# Patient Record
Sex: FEMALE | Race: WHITE | NOT HISPANIC OR LATINO | URBAN - METROPOLITAN AREA
[De-identification: names, ages, dates, MRNs, and addresses within clinical notes are randomized per-mention and may not be internally consistent; named-entity substitution may affect disease eponyms.]

---

## 2019-12-28 ENCOUNTER — EMERGENCY (EMERGENCY)
Facility: HOSPITAL | Age: 39
LOS: 1 days | End: 2019-12-28
Attending: EMERGENCY MEDICINE
Payer: MEDICAID

## 2019-12-28 VITALS
DIASTOLIC BLOOD PRESSURE: 74 MMHG | WEIGHT: 179.9 LBS | RESPIRATION RATE: 16 BRPM | SYSTOLIC BLOOD PRESSURE: 124 MMHG | HEIGHT: 65 IN | OXYGEN SATURATION: 96 % | TEMPERATURE: 98 F | HEART RATE: 100 BPM

## 2019-12-28 PROCEDURE — 99283 EMERGENCY DEPT VISIT LOW MDM: CPT | Mod: 25

## 2019-12-28 PROCEDURE — 12011 RPR F/E/E/N/L/M 2.5 CM/<: CPT
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
## 2019-12-28 NOTE — ED ADULT TRIAGE NOTE - STATUS:
Applied I will SWITCH the dose or number of times a day I take the medications listed below when I get home from the hospital:  None
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
## 2019-12-31 NOTE — ED PROVIDER NOTE - PATIENT PORTAL LINK FT
You can access the FollowMyHealth Patient Portal offered by [facility] by registering at the following website: [email or web address]. By joining [employer]’s FollowMyHealth portal, you will also be able to view your health information using other applications (apps) compatible with our system.

## 2019-12-31 NOTE — ED PROVIDER NOTE - PHYSICAL EXAMINATION
A&Ox3,follows command, responds appropriately  CN: II-XII : Conjugate gaze, PERRLA, Visual field full to confrontation, EOMI with out nystagmus, pursuit smooth without saccade.  Facial: Sensation and muscle activation intact bilateral. Hearing intact bilateral  Palate: Elevate symmetrically . Shoulder shrug and neck turn full strength. Tongue midline  Motor: UE and LE strength 5/5  Sensory : pin prick and temp intact and equal bilaterally. Vibration and  proprioception intact bilaterally   Reflex : Biceps    brachioradialis        triceps patella  achilles  L              2+                2+                   2+       2+       2+  R              2+                2+                  2 +       2+       2+  Cerebellar: Rapid alternating movement and regular rhythm without bradykinesia                      Finger to nose and heel-to-shin intact bilaterally without dysmetria or overshoot  Gait narrow base. No shuffling. Full hip flexion and knee flexion. Negative Romberg  No involuntary movement . No pronotor drift. No clonus.

## 2019-12-31 NOTE — ED PROVIDER NOTE - NONTENDER LOCATION
left lower quadrant/periumbilical/left upper quadrant/umbilical/right upper quadrant/right lower quadrant
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
## 2020-04-20 ENCOUNTER — EMERGENCY (EMERGENCY)
Facility: HOSPITAL | Age: 40
LOS: 1 days | Discharge: HOME | End: 2020-04-20
Attending: EMERGENCY MEDICINE
Payer: MEDICAID

## 2020-04-20 VITALS
HEART RATE: 86 BPM | OXYGEN SATURATION: 97 % | SYSTOLIC BLOOD PRESSURE: 146 MMHG | TEMPERATURE: 98 F | RESPIRATION RATE: 18 BRPM | DIASTOLIC BLOOD PRESSURE: 84 MMHG

## 2020-04-20 PROBLEM — Z78.9 OTHER SPECIFIED HEALTH STATUS: Chronic | Status: ACTIVE | Noted: 2019-12-28

## 2020-04-20 LAB
ALBUMIN SERPL ELPH-MCNC: 4.4 G/DL — SIGNIFICANT CHANGE UP (ref 3.3–5.2)
ALP SERPL-CCNC: 63 U/L — SIGNIFICANT CHANGE UP (ref 40–120)
ALT FLD-CCNC: 7 U/L — SIGNIFICANT CHANGE UP
ANION GAP SERPL CALC-SCNC: 15 MMOL/L — SIGNIFICANT CHANGE UP (ref 5–17)
APTT BLD: 27.6 SEC — SIGNIFICANT CHANGE UP (ref 27.5–36.3)
AST SERPL-CCNC: 12 U/L — SIGNIFICANT CHANGE UP
BASOPHILS # BLD AUTO: 0.18 K/UL — SIGNIFICANT CHANGE UP (ref 0–0.2)
BASOPHILS NFR BLD AUTO: 0.9 % — SIGNIFICANT CHANGE UP (ref 0–2)
BILIRUB SERPL-MCNC: 0.5 MG/DL — SIGNIFICANT CHANGE UP (ref 0.4–2)
BLD GP AB SCN SERPL QL: SIGNIFICANT CHANGE UP
BUN SERPL-MCNC: 9 MG/DL — SIGNIFICANT CHANGE UP (ref 8–20)
CALCIUM SERPL-MCNC: 9.6 MG/DL — SIGNIFICANT CHANGE UP (ref 8.6–10.2)
CHLORIDE SERPL-SCNC: 101 MMOL/L — SIGNIFICANT CHANGE UP (ref 98–107)
CO2 SERPL-SCNC: 24 MMOL/L — SIGNIFICANT CHANGE UP (ref 22–29)
CREAT SERPL-MCNC: 0.84 MG/DL — SIGNIFICANT CHANGE UP (ref 0.5–1.3)
EOSINOPHIL # BLD AUTO: 0 K/UL — SIGNIFICANT CHANGE UP (ref 0–0.5)
EOSINOPHIL NFR BLD AUTO: 0 % — SIGNIFICANT CHANGE UP (ref 0–6)
ETHANOL SERPL-MCNC: <10 MG/DL — SIGNIFICANT CHANGE UP
GLUCOSE SERPL-MCNC: 91 MG/DL — SIGNIFICANT CHANGE UP (ref 70–99)
HCT VFR BLD CALC: 43.7 % — SIGNIFICANT CHANGE UP (ref 34.5–45)
HGB BLD-MCNC: 14.5 G/DL — SIGNIFICANT CHANGE UP (ref 11.5–15.5)
INR BLD: 0.99 RATIO — SIGNIFICANT CHANGE UP (ref 0.88–1.16)
LIDOCAIN IGE QN: 26 U/L — SIGNIFICANT CHANGE UP (ref 22–51)
LYMPHOCYTES # BLD AUTO: 10.5 % — LOW (ref 13–44)
LYMPHOCYTES # BLD AUTO: 2.09 K/UL — SIGNIFICANT CHANGE UP (ref 1–3.3)
MANUAL SMEAR VERIFICATION: SIGNIFICANT CHANGE UP
MCHC RBC-ENTMCNC: 31.5 PG — SIGNIFICANT CHANGE UP (ref 27–34)
MCHC RBC-ENTMCNC: 33.2 GM/DL — SIGNIFICANT CHANGE UP (ref 32–36)
MCV RBC AUTO: 95 FL — SIGNIFICANT CHANGE UP (ref 80–100)
MONOCYTES # BLD AUTO: 0.87 K/UL — SIGNIFICANT CHANGE UP (ref 0–0.9)
MONOCYTES NFR BLD AUTO: 4.4 % — SIGNIFICANT CHANGE UP (ref 2–14)
NEUTROPHILS # BLD AUTO: 15.69 K/UL — HIGH (ref 1.8–7.4)
NEUTROPHILS NFR BLD AUTO: 78.9 % — HIGH (ref 43–77)
PLAT MORPH BLD: NORMAL — SIGNIFICANT CHANGE UP
PLATELET # BLD AUTO: 388 K/UL — SIGNIFICANT CHANGE UP (ref 150–400)
POTASSIUM SERPL-MCNC: 4.1 MMOL/L — SIGNIFICANT CHANGE UP (ref 3.5–5.3)
POTASSIUM SERPL-SCNC: 4.1 MMOL/L — SIGNIFICANT CHANGE UP (ref 3.5–5.3)
PROT SERPL-MCNC: 7 G/DL — SIGNIFICANT CHANGE UP (ref 6.6–8.7)
PROTHROM AB SERPL-ACNC: 11.2 SEC — SIGNIFICANT CHANGE UP (ref 10–12.9)
RBC # BLD: 4.6 M/UL — SIGNIFICANT CHANGE UP (ref 3.8–5.2)
RBC # FLD: 12.1 % — SIGNIFICANT CHANGE UP (ref 10.3–14.5)
RBC BLD AUTO: NORMAL — SIGNIFICANT CHANGE UP
SODIUM SERPL-SCNC: 140 MMOL/L — SIGNIFICANT CHANGE UP (ref 135–145)
VARIANT LYMPHS # BLD: 5.3 % — SIGNIFICANT CHANGE UP (ref 0–6)
WBC # BLD: 19.88 K/UL — HIGH (ref 3.8–10.5)
WBC # FLD AUTO: 19.88 K/UL — HIGH (ref 3.8–10.5)

## 2020-04-20 PROCEDURE — 80053 COMPREHEN METABOLIC PANEL: CPT

## 2020-04-20 PROCEDURE — 70450 CT HEAD/BRAIN W/O DYE: CPT | Mod: 26

## 2020-04-20 PROCEDURE — 96374 THER/PROPH/DIAG INJ IV PUSH: CPT

## 2020-04-20 PROCEDURE — 70450 CT HEAD/BRAIN W/O DYE: CPT

## 2020-04-20 PROCEDURE — 83690 ASSAY OF LIPASE: CPT

## 2020-04-20 PROCEDURE — 36415 COLL VENOUS BLD VENIPUNCTURE: CPT

## 2020-04-20 PROCEDURE — 96375 TX/PRO/DX INJ NEW DRUG ADDON: CPT

## 2020-04-20 PROCEDURE — 86901 BLOOD TYPING SEROLOGIC RH(D): CPT

## 2020-04-20 PROCEDURE — 85027 COMPLETE CBC AUTOMATED: CPT

## 2020-04-20 PROCEDURE — 99281 EMR DPT VST MAYX REQ PHY/QHP: CPT

## 2020-04-20 PROCEDURE — 85730 THROMBOPLASTIN TIME PARTIAL: CPT

## 2020-04-20 PROCEDURE — 85610 PROTHROMBIN TIME: CPT

## 2020-04-20 PROCEDURE — 80307 DRUG TEST PRSMV CHEM ANLYZR: CPT

## 2020-04-20 PROCEDURE — 90715 TDAP VACCINE 7 YRS/> IM: CPT

## 2020-04-20 PROCEDURE — 90471 IMMUNIZATION ADMIN: CPT

## 2020-04-20 PROCEDURE — 86900 BLOOD TYPING SEROLOGIC ABO: CPT

## 2020-04-20 PROCEDURE — 99291 CRITICAL CARE FIRST HOUR: CPT

## 2020-04-20 PROCEDURE — 99291 CRITICAL CARE FIRST HOUR: CPT | Mod: 25

## 2020-04-20 PROCEDURE — 86850 RBC ANTIBODY SCREEN: CPT

## 2020-04-20 RX ORDER — HYDROMORPHONE HYDROCHLORIDE 2 MG/ML
1 INJECTION INTRAMUSCULAR; INTRAVENOUS; SUBCUTANEOUS ONCE
Refills: 0 | Status: DISCONTINUED | OUTPATIENT
Start: 2020-04-20 | End: 2020-04-20

## 2020-04-20 RX ORDER — CEPHALEXIN 500 MG
1 CAPSULE ORAL
Qty: 40 | Refills: 0
Start: 2020-04-20 | End: 2020-04-29

## 2020-04-20 RX ORDER — TETANUS TOXOID, REDUCED DIPHTHERIA TOXOID AND ACELLULAR PERTUSSIS VACCINE, ADSORBED 5; 2.5; 8; 8; 2.5 [IU]/.5ML; [IU]/.5ML; UG/.5ML; UG/.5ML; UG/.5ML
0.5 SUSPENSION INTRAMUSCULAR ONCE
Refills: 0 | Status: COMPLETED | OUTPATIENT
Start: 2020-04-20 | End: 2020-04-20

## 2020-04-20 RX ORDER — CEFAZOLIN SODIUM 1 G
2000 VIAL (EA) INJECTION ONCE
Refills: 0 | Status: COMPLETED | OUTPATIENT
Start: 2020-04-20 | End: 2020-04-20

## 2020-04-20 RX ADMIN — Medication 100 MILLIGRAM(S): at 13:05

## 2020-04-20 RX ADMIN — TETANUS TOXOID, REDUCED DIPHTHERIA TOXOID AND ACELLULAR PERTUSSIS VACCINE, ADSORBED 0.5 MILLILITER(S): 5; 2.5; 8; 8; 2.5 SUSPENSION INTRAMUSCULAR at 13:05

## 2020-04-20 RX ADMIN — HYDROMORPHONE HYDROCHLORIDE 1 MILLIGRAM(S): 2 INJECTION INTRAMUSCULAR; INTRAVENOUS; SUBCUTANEOUS at 13:07
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
## 2020-04-20 NOTE — ED PROVIDER NOTE - NEUROLOGICAL, MLM
Alert and oriented, no focal deficits, no motor or sensory deficits. 3 separate 1 cm linear lacerations to the R lateral thigh with fat exposed, no muscle exposed, full ROM and MS of the RLE

## 2020-04-20 NOTE — ED PROVIDER NOTE - PATIENT PORTAL LINK FT
You can access the FollowMyHealth Patient Portal offered by [facility] by registering at the following website: [email or web address]. By joining [employer]’s FollowMyHealth portal, you will also be able to view your health information using other applications (apps) compatible with our system.
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
## 2020-04-20 NOTE — ED PROVIDER NOTE - CARDIAC, MLM
Normal rate, regular rhythm.  Heart sounds S1, S2.  No murmurs, rubs or gallops. +DP and PT pulse on R leg, no carotid bruit b/l

## 2020-04-20 NOTE — H&P ADULT - NSHPPHYSICALEXAM_GEN_ALL_CORE
Constitutional: Well-developed well nourished Female in no acute distress  HEENT: Head is normocephalic, Ecchymosis to right temporal region, echymosis around neck, maxillofacial structures stable, no blood or discharge from nares or oral cavity, no robledo sign / racoon eyes, EOMI b/l, pupils [3]mm round and reactive to light b/l, no active drainage or redness  Neck: trachea midline, traumatic brusing to neck  Respiratory: Breath sounds CTA b/l respirations are unlabored, no accessory muscle use, no conversational dyspnea  Cardiovascular: Regular rate & rhythm, +S1, S1, Chest wall is non-tender to palpation, no subQ emphysema or crepitus palpated  Gastrointestinal: Abdomen soft, non-tender, non-distended, no rebound tenderness / guarding, no ecchymosis or external signs of abdominal trauma  Musculoskeletal: moving all extremities spontaneously, no point tenderness or deformity noted to upper or lower extremities b/l, right lateral thigh with 3 2 centimeter laceration, 1 subcentimeter laceration to right lateral thigh. Superficial laceration to right UE. Echymosis to b/l upper extremities  Pelvis: stable  Vascular: 2+ radial, femoral, and DP pulses b/l  Neurological: GCS: 15 (4/5/6). A&O x 3; no gross sensory / motor / coordination deficits  Musculoskeletal: 5/5 strength of upper and lower extremities b/l  Neuropsinal: no C/T/LS spine tenderness to palpation, no step-offs or signs of external trauma to the back

## 2020-04-20 NOTE — H&P ADULT - ATTENDING COMMENTS
I have seen and examined  patient.  ABC intact, right lateral thing 3 open wounds. no other wounds.  wound to be irrigated and partially closed.  needs  evaluation for safe discharge .  Dipso per ed and Social eval

## 2020-04-20 NOTE — ED ADULT NURSE NOTE - CAS EDN DISCHARGE ASSESSMENT
Awake/Symptoms improved/Alert and oriented to person, place and time/Dressing clean and dry/No adverse reaction to first time med in ED/Patient baseline mental status
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
## 2022-03-04 NOTE — ED PROVIDER NOTE - EYES NEGATIVE STATEMENT, MLM
Addended by: TORREY ALVARENGA on: 3/4/2022 02:47 PM     Modules accepted: Orders     no discharge, no irritation, no pain, no redness, and no visual changes.

## 2022-06-16 PROBLEM — Z00.00 ENCOUNTER FOR PREVENTIVE HEALTH EXAMINATION: Status: ACTIVE | Noted: 2022-06-16

## 2023-05-22 ENCOUNTER — EMERGENCY (EMERGENCY)
Facility: HOSPITAL | Age: 43
LOS: 1 days | Discharge: LEFT BEFORE DISCHARGE | End: 2023-05-22
Payer: MEDICAID

## 2023-05-22 VITALS
HEART RATE: 84 BPM | RESPIRATION RATE: 18 BRPM | SYSTOLIC BLOOD PRESSURE: 164 MMHG | DIASTOLIC BLOOD PRESSURE: 108 MMHG | TEMPERATURE: 98 F | HEIGHT: 65 IN | OXYGEN SATURATION: 98 % | WEIGHT: 179.9 LBS

## 2023-05-22 PROCEDURE — L9991: CPT

## 2023-05-22 NOTE — ED ADULT TRIAGE NOTE - NS ED TRIAGE AVPU SCALE
DIET                          Start with liquids and advance to your usual diet   If nauseated, stay on clear liquids until it passes.  If nausea persists, contact your surgeon.  No alcoholic beverages for 24 hours after surgery or if taking narcotics.  Drink plenty of water to keep urine pale yellow color.    ACTIVITY                 Avoid prolonged sitting and/or standing.  Keep legs elevated on an ottoman or in a recliner when sitting.  Do not lift heavy objects or engage in strenuous activity for at least one week.  Walk 5-10 minutes an hour while awake for one week after your surgery.  Local anesthesia patients may resume driving the day of surgery.     ANESTHESIA  Anesthetics may make you feel dizzy, tired, irritable, and unsteady.  You need to have an adult (18 years or older) stay with you overnight.  Do not drive for 24 hours after surgery or if taking narcotics.    MEDICATION            Resume your usual medication, unless otherwise instructed by your surgeon.  Prescriptions have been written for you for the following:norco  for pain. One norco given at 2:23  Do not take additional Tylenol/acetaminophen with your prescription pain medication.   Motrin/ibuprofen may be alternated with prescription pain medication or Tylenol.    WOUND CARE         Wash your hands with soap and water before and after touching your dressing or surgical site.                           Keep the ACE bandage dry and in place ufollow up appointment        resume wearing your compression stockings while you are awake for the first week.   It is common to have some tenderness and/or a tethering/pulling sensation in your leg.  It is also common to have some bruising.    Call the office with any questions, concerns or any of the following:    Fever over 101.0 degrees  Shortness of breath  Prolonged tenderness, redness, or warmth along the surgical site  Excessive pain which inhibits normal activity  Excessive swelling in the surgical  limb  Office Number [phone] and After Hours [phone]                                              Patient and accompanying adult have been instructed on the above. Patient and family  of the patient verbalize the understanding of the above instructions. All belongings have been returned.     -Can walk, do stairs, and light activity  -Do toe raises, and walking place, and be active   -Start with a bland diet like cereal, soup.  Can slowly advance to a regular diet.  Avoid heavy, greasy, spicy food, or overeating  -Stay hydrated.  Make sure you drink enough water so that your urine remains pale yellow  -Can do light exercise like treadmill, or elliptical,  -bandages will be removed at your follow-up appointment in 48 hours  -No baths, or swimming  -Can drive when you are off of pain medication, and reaction time is good  -You have been given a prescription for pain medicine that has Tylenol, and a narcotic in it.  Do not take additional Tylenol with this medication.  You do not have to take this medication, or fill it.  It is for pain not controlled on over-the-counter pain medication like Tylenol, ibuprofen, and an ice pack  -It is not uncommon to have some bleeding from your wounds when you first start to walk around.  If this does happen, sit down, elevate your leg, and apply steady direct pressure to the area that is bleeding.  It should stop within about 10 minutes.  If there is persistent bleeding please give Dr. Acevedo's office a call  -You will have bruising on your legs postoperatively.  -Call Dr. Acevedo if questions or problems [phone], after hours [phone]     Alert-The patient is alert, awake and responds to voice. The patient is oriented to time, place, and person. The triage nurse is able to obtain subjective information.

## 2023-06-04 ENCOUNTER — EMERGENCY (EMERGENCY)
Facility: HOSPITAL | Age: 43
LOS: 1 days | Discharge: HOME | End: 2023-06-04
Attending: EMERGENCY MEDICINE
Payer: MEDICAID

## 2023-06-04 VITALS
WEIGHT: 184.97 LBS | HEIGHT: 65 IN | TEMPERATURE: 98 F | HEART RATE: 82 BPM | RESPIRATION RATE: 20 BRPM | SYSTOLIC BLOOD PRESSURE: 141 MMHG | DIASTOLIC BLOOD PRESSURE: 91 MMHG | OXYGEN SATURATION: 98 %

## 2023-06-04 PROCEDURE — 99283 EMERGENCY DEPT VISIT LOW MDM: CPT

## 2023-06-04 RX ORDER — IBUPROFEN 200 MG
1 TABLET ORAL
Qty: 28 | Refills: 0
Start: 2023-06-04 | End: 2023-06-10

## 2023-06-04 RX ORDER — OXYCODONE AND ACETAMINOPHEN 5; 325 MG/1; MG/1
1 TABLET ORAL
Qty: 6 | Refills: 0
Start: 2023-06-04 | End: 2023-06-05

## 2023-06-04 RX ORDER — IBUPROFEN 200 MG
600 TABLET ORAL ONCE
Refills: 0 | Status: COMPLETED | OUTPATIENT
Start: 2023-06-04 | End: 2023-06-04

## 2023-06-04 RX ADMIN — Medication 600 MILLIGRAM(S): at 14:54

## 2023-06-04 RX ADMIN — Medication 1 TABLET(S): at 14:55

## 2023-06-04 NOTE — ED STATDOCS - ENMT, MLM
right lower molar with dental cavity and mild gingivitis, no submandibular swelling, no abscess, airway intact

## 2023-06-04 NOTE — ED STATDOCS - OBJECTIVE STATEMENT
44 y/o female with no significant pmhx c/o tooth pain x3 weeks which worsened today. Pt states hasn't seen a dentist yet and is currently on a waiting list to see one.

## 2023-06-04 NOTE — ED ADULT TRIAGE NOTE - ESI TRIAGE ACUITY LEVEL, MLM
4 Cephalexin Pregnancy And Lactation Text: This medication is Pregnancy Category B and considered safe during pregnancy.  It is also excreted in breast milk but can be used safely for shorter doses.

## 2023-06-24 ENCOUNTER — EMERGENCY (EMERGENCY)
Facility: HOSPITAL | Age: 43
LOS: 1 days | Discharge: HOME | End: 2023-06-24
Attending: STUDENT IN AN ORGANIZED HEALTH CARE EDUCATION/TRAINING PROGRAM
Payer: MEDICAID

## 2023-06-24 VITALS
SYSTOLIC BLOOD PRESSURE: 145 MMHG | OXYGEN SATURATION: 97 % | HEART RATE: 73 BPM | WEIGHT: 187.17 LBS | DIASTOLIC BLOOD PRESSURE: 89 MMHG | TEMPERATURE: 98 F | RESPIRATION RATE: 20 BRPM | HEIGHT: 65 IN

## 2023-06-24 VITALS — RESPIRATION RATE: 16 BRPM | OXYGEN SATURATION: 99 %

## 2023-06-24 PROCEDURE — 99284 EMERGENCY DEPT VISIT MOD MDM: CPT

## 2023-06-24 PROCEDURE — 99283 EMERGENCY DEPT VISIT LOW MDM: CPT

## 2023-06-24 RX ORDER — DEXAMETHASONE 0.5 MG/5ML
6 ELIXIR ORAL ONCE
Refills: 0 | Status: COMPLETED | OUTPATIENT
Start: 2023-06-24 | End: 2023-06-24

## 2023-06-24 RX ADMIN — Medication 6 MILLIGRAM(S): at 23:08

## 2023-06-24 NOTE — ED STATDOCS - NSFOLLOWUPINSTRUCTIONS_ED_ALL_ED_FT
Today on examination it appears that you have some swelling around the area where your nerve block was performed. It does not appear infected, and is likely from multiple punctures being performed to do the nerve block.     We gave you a dose of decadron, a strong steroid medication to reduce the inflammation, this will help bring down the swelling more quickly.     Take ibuprofen (or Motrin) 600mg (3 tablets) up to 4 times per day as needed for pain with food or milk.     Take acetaminophen (Tylenol) 975mg (3 regular strength tablets or 2 extra strength tablets) as often as every 6 hours as needed for pain. Never take more than 4000mg of acetaminophen/Tylenol in any 24 hour period. Be cautious of over the counter medications as many formularies contain acetaminophen in them.     Continue the current antibiotic regimen you are on as prescribed from your dentist.     Follow up with your primary doctor as needed for repeat evaluation in 2-3 days to discuss your current antibiotic regimen and facial paresthesia (the numbness/tingling/shooting sensations).     Return here or go to the nearest emergency department for repeat evaluation if you develop any new symptoms of acute concern such as severe pain, intractable nausea/vomiting, swelling on your face or throat, any difficulties breathing, or any other new worries.

## 2023-06-24 NOTE — ED ADULT NURSE NOTE - NSFALLUNIVINTERV_ED_ALL_ED
Bed/Stretcher in lowest position, wheels locked, appropriate side rails in place/Call bell, personal items and telephone in reach/Instruct patient to call for assistance before getting out of bed/chair/stretcher/Non-slip footwear applied when patient is off stretcher/Agenda to call system/Physically safe environment - no spills, clutter or unnecessary equipment/Purposeful proactive rounding/Room/bathroom lighting operational, light cord in reach
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
## 2023-06-24 NOTE — ED STATDOCS - PHYSICAL EXAMINATION
Gen: NAD, non-toxic, conversational  Eyes: PERRLA, EOMI   HENT: Normocephalic, atraumatic. External ears normal, no rhinorrhea, moist mucous membranes. right inferior alveolar ridge with punctate lesion approximately 3mm rhomboid shape with redness and fluctuance on palpation, consistent with prior puncture site, no lip swelling, no tongue swelling, no uvular swelling,   CV: RRR, no M/R/G  Resp: CTAB, non-labored, speaking without difficulty on room air  Abd: soft, non tender, non rigid, no guarding or rebound tenderness  Back: No CVAT bilaterally, no midline ttp  Skin: dry, wwp, no rashes  Neuro: AOx3, speech is fluent and appropriate  Psych: Mood ok, affect euthymic

## 2023-06-24 NOTE — ED ADULT TRIAGE NOTE - CHIEF COMPLAINT QUOTE
Patient presents to ED with c/o allergic reaction to Clindamycin.  Started Clindamycin on Wednesday for tooth extraction.  Symptoms started on Thursday but have worsened.  Symptoms include bottom lip feels like it is on fire and numbness to right cheek down to lower lip.  Denies difficulty breathing.  Took Motrin.  Last dose 1700

## 2023-06-24 NOTE — ED STATDOCS - OBJECTIVE STATEMENT
44 y/o female presents to the ED c/o intermittent numbness and swelling to lip for the past 3 days. Pt states she had dental procedure done and had block done in back of the mouth. Pt states was placed on clindamycin after having tooth extracted, denies rashes, denies hx of drug allergies in the past. Pt has been taking Motrin 800mg without relief if symptoms.

## 2023-06-24 NOTE — ED STATDOCS - PATIENT PORTAL LINK FT
You can access the FollowMyHealth Patient Portal offered by [facility] by registering at the following website: [email or web address]. By joining [employer]’s FollowMyHealth portal, you will also be able to view your health information using other applications (apps) compatible with our system.
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
## 2024-04-09 NOTE — ED PROVIDER NOTE - NS ED ATTENDING STATEMENT MOD
I have personally performed a face to face diagnostic evaluation on this patient. I have reviewed the ACP note and agree with the history, exam and plan of care, except as noted.
11-Sep-2023
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
